# Patient Record
Sex: MALE | Race: BLACK OR AFRICAN AMERICAN | URBAN - METROPOLITAN AREA
[De-identification: names, ages, dates, MRNs, and addresses within clinical notes are randomized per-mention and may not be internally consistent; named-entity substitution may affect disease eponyms.]

---

## 2021-07-16 PROBLEM — Z79.4 TYPE 2 DIABETES MELLITUS WITHOUT COMPLICATION, WITH LONG-TERM CURRENT USE OF INSULIN (HCC): Status: ACTIVE | Noted: 2017-03-28

## 2021-07-16 PROBLEM — E11.9 TYPE 2 DIABETES MELLITUS WITHOUT COMPLICATION, WITH LONG-TERM CURRENT USE OF INSULIN (HCC): Status: ACTIVE | Noted: 2017-03-28

## 2021-11-17 ENCOUNTER — OFFICE VISIT (OUTPATIENT)
Dept: ORTHOPEDIC SURGERY | Age: 31
End: 2021-11-17
Payer: MEDICAID

## 2021-11-17 VITALS
TEMPERATURE: 96.9 F | WEIGHT: 272 LBS | OXYGEN SATURATION: 100 % | BODY MASS INDEX: 36.84 KG/M2 | HEART RATE: 81 BPM | RESPIRATION RATE: 15 BRPM | HEIGHT: 72 IN
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
## 2021-11-17 DIAGNOSIS — S80.01XA CONTUSION OF RIGHT KNEE, INITIAL ENCOUNTER: ICD-10-CM

## 2021-11-17 DIAGNOSIS — M25.561 ACUTE PAIN OF RIGHT KNEE: Primary | ICD-10-CM

## 2021-11-17 PROCEDURE — 73562 X-RAY EXAM OF KNEE 3: CPT | Performed by: PHYSICIAN ASSISTANT

## 2021-11-17 PROCEDURE — 99203 OFFICE O/P NEW LOW 30 MIN: CPT | Performed by: PHYSICIAN ASSISTANT

## 2021-11-17 NOTE — PROGRESS NOTES
Patient: Ye Merino                MRN: [id number]       SSN: [id number]  YOB: 1990        AGE: 27 y.o. SEX: male          PCP: Jammie Jones MD  21    Chief Complaint   Patient presents with    Knee Pain     right       HISTORY:  Ye Merino is a 27 y.o. male who presents to the office complaining of right knee pain. He is status post injury on the job date of injury 10/23/2021. 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 He was walking up a small grassy hill when he lost his balance striking his right knee squarely on the ground. He was able to walk weightbearing afterwards but had pain develop late the evening of and was subsequently seen through [facility] with x-rays obtained and a diagnosis of injury to the right knee provided. He was placed on limitations which have now . He has not been on the job since 10/24/2021. Today he reports no pain to the knee. He is weightbearing as tolerated.           Pain Assessment  2021   Location of Pain Knee   Location Modifiers Right   Severity of Pain 6   Quality of Pain Aching   Duration of Pain Persistent   Frequency of Pain Constant   Aggravating Factors Bending;Stretching;Straightening;Exercise;Squatting;Standing;Kneeling;Walking;Stairs   Limiting Behavior Yes   Relieving Factors Rest;Elevation;NSAID   Result of Injury No           No results found for: HBA1C, KTL0EIKE, BGH0CXHS  Weight Metrics 2021   Weight 272 lb 275 lb   BMI 37.41 kg/m2 37.82 kg/m2            Problem List Items Addressed This Visit     None      Visit Diagnoses     Acute pain of right knee    -  Primary    Relevant Orders    AMB POC X-RAY KNEE 3 VIEW (Completed)    Contusion of right knee, initial encounter              PAST MEDICAL HISTORY:   Past Medical History:   Diagnosis Date    Diabetes mellitus ([address]     Groin pain        PAST SURGICAL HISTORY: History reviewed. No pertinent surgical history. ALLERGIES:   Allergies   Allergen Reactions    Sulfa (Sulfonamide Antibiotics) Itching        CURRENT MEDICATIONS:  A list of medications prior to the time of admission include:  Prior to Admission medications    Medication Sig Start Date End Date Taking? Authorizing Provider   nystatin-triamcinolone (MYCOLOG II) topical cream Apply  to affected area two (2) times a day. 8/20/21  Yes KATH Chairez   simvastatin (ZOCOR) 10 mg tablet TAKE 1 TABLET BY MOUTH EVERY DAY AT NIGHT 6/28/21  Yes Provider, Historical   naproxen (NAPROSYN) 500 mg tablet Take 500 mg by mouth two (2) times a day. 6/30/21  Yes Provider, Historical   metFORMIN ER (GLUCOPHAGE XR) 500 mg tablet TAKE 2 TABLETS BY MOUTH TWICE A DAY 6/28/21  Yes Provider, Historical   lisinopriL (PRINIVIL, ZESTRIL) 20 mg tablet TAKE 1 TABLET BY MOUTH EVERY DAY 6/28/21  Yes Provider, Historical   Victoza 3-Perfecto 0.6 mg/0.1 mL (18 mg/3 mL) pnij INJECT 1.8 MG SUBCUTANEOUSLY DAILY 6/9/21  Yes Provider, Historical   Lantus Solostar U-100 Insulin 100 unit/mL (3 mL) inpn  7/7/21  Yes Provider, Historical       FAMILY HISTORY:   Family History   Family history unknown: Yes       SOCIAL HISTORY:   Social History     Socioeconomic History    Marital status: SINGLE   Tobacco Use    Smoking status: Former Smoker    Smokeless tobacco: Never Used   Vaping Use    Vaping Use: Never used   Substance and Sexual Activity    Alcohol use: Not Currently       ROS:No CP, No SOB, No fever/chills nor night sweats. No headaches, vision abnormalities to include double and oral loss of vision. No hearing abnormalities. Musculoskeletal pain per HPI. Pain is exacerbated positionally. Pt denies h/o spinal surgery, injections, or PT/chiropractor. Self treated with less than adequate relief on oral antiinflammatories. . Pt denies change in bowel or bladder habits.  Pt denies fever, weight loss, or skin changes. PHYSICAL EXAM:    Visit Vitals  Pulse 81   Temp 96.9 °F (36.1 °C)   Resp 15   Ht 5' 11.5\" (1.816 m)   Wt 272 lb (123.4 kg)   SpO2 100%   BMI 37.41 kg/m²       Constitutional: Appears well-developed and well-nourished. No distress. Sitting comfortably in the exam room, interacting with conversation with pleasant affect. Gait is steady and patient exhibits no evidence of ataxia. Patient is able to ambulate without difficulty. No focal neurological deficit noted. No facial droop, slurred speech, or evidence of altered mentation noted on exam.   Skin: Skin over the head, neck, bilateral limbs, and trunk is warm and dry. No rash or erythema noted. Cranial Nerves II-XII grossly intact  HENT: NC/AT. Normal symmetry, bulk and tone of facial and neck musculature. Trachea midline. No discernible thyromegaly or masses. No involuntary movements. Lymphatic: No preauricular, submandibuar, anterior or posterior cervical lymphadenopathy. Psychiatric: The patient is awake, alert, and oriented to person, place and time. Behavior is normal. Thought content normal.   Cardiovascular: No clubbing, cyanosis. No edema bilateral lower extremities. Pulmonary: No tripoding nor accessory muscle recruitment. Breathing normally, no distress, no audible wheezing. Distal cap refill intact at 2/2 Javier UE / LE. Neuro intact Javier UE/LE to noxious stimuli        Ortho Specific exam:    Right knee reveals no warmth, erythema, or ecchymosis. He has soft tissue swelling seen over the inferior pole of the patella in the prepatellar bursal area. No effusions noted. Patient has while supine active range of motion of 105 degrees to full extension. Patella tracks normal midline with crepitation. Quad and patellar tendons are intact and nontender no palpable defects. MCL and LCL are intact with no laxity. ACL and PCL intact with no laxity. Negative Tiffanie's sign. No popliteal fullness noted.     [facility] [place]17[facility]20[place] space 3 view of the left knee reveals early degenerative changes seen in the medial and lateral joint space. There is a large enthesophyte at the superior pole of the patella. No soft tissue ossifications. No acute fracture deformity lesions or masses noted. There is about the posterior joint space a calcific density without evidence of avulsion fracture association. IMPRESSION:      ICD-10-CM ICD-9-CM    1. Acute pain of right knee  M25.561 719.46 AMB POC X-RAY KNEE 3 VIEW   2. Contusion of right knee, initial encounter  S80. 01XA 924.11         PLAN: I do not find anything abnormal about patient's knee today therefore I am recommending he return to work full duty. He will follow back on a as needed basis. He may use Tylenol Motrin for pain if necessary per 's recommended dosing. Additionally today we discussed the diagnosis of obesity and the importance of weight management for both cardiovascular health. The patient was recommended to decrease carbohydrate and sugar intake. Patient recommended a formal dietary consult which they will consider and return a call to our office. In light of the patient's osteoarthritic findings I am making a recommendation for aerobic exercise to include but not limited to stationary bicycle, elliptical, therapeutic walking with good shoes and or swimming. Patient should avoid any running or jumping. If using the treadmill then recommendation for no elevation and no running or jogging. No Narcotic indicated today. Patient given pain medication for short term acute pain relief. Goal is to treat patient according to above plan and to ultimately have patient off all pain medications once appropriate. If chronic pain management is required beyond what is expected for current orthopedic problem, will refer patient to pain management.   was reviewed and will be reviewed with every medication refill request. Patient provided a reminder for a \"due or due soon\" health maintenance. I have asked the patient to schedule an appointment with their primary care provider for follow-up on general health maintenance concerns. Today all the patient's questions were answered to their satisfaction. Copies of x-rays reviewed if obtained this visit, and provided to patient. Dictation disclaimer:  Please note that this dictation was completed with [email or web address], the computer voice recognition software. Quite often unanticipated grammatical, syntax, homophones, and other interpretive errors are inadvertently transcribed by the computer software. Please disregard these errors. Please excuse any errors that have escaped final proofreading. Moni Mayes  APA, APC, MPAS, PA-C  [employer]